# Patient Record
(demographics unavailable — no encounter records)

---

## 2025-02-20 NOTE — PHYSICAL EXAM
[TextEntry] : Normal BMI. +1 tonsils. Kirkpatrick tongue position 2.    PHYSICAL EXAM   General: The patient was alert and oriented and in no distress.   PROCEDURE: Microscopic ear exam INDICATIONS:  Hearing loss. Cerumen impaction Left: Pinna normal,  SIGNIFICANT CERUMEN IMPACTION REMOVED USING OTOLOGIC INSTRUMENTS (loop, suction and alligator), EAC and tympanic membrane within normal limits. No evidence of fluid in the middle ear space. Right: Pinna normal,  SIGNIFICANT CERUMEN IMPACTION REMOVED USING OTOLOGIC INSTRUMENTS (loop, suction and alligator), EAC and tympanic membrane within normal limits. No evidence of fluid in the middle ear space

## 2025-02-20 NOTE — HISTORY OF PRESENT ILLNESS
[de-identified] : Initial visit, referred in consultation. He presents concern for obstructive sleep apnea syndrome.  I reviewed his sleep study from 2021 that demonstrated mild obstructive sleep apnea syndrome. More recently had a sleep study that demonstrated an apnea-hypopnea index of 40.2. Dr. Donaldson started him on CPAP. He reports that he is used 2 different masks and is not able to tolerate it. He does report that he lost 20 pounds recently. He has a follow-up with Dr. Donaldson in the near future.  He has a friend who is having sleep apnea surgery in the nose and oral cavity.  He does report that he was evaluated by an otolaryngologist in the past and found have a deviated septum.  He reports that he cannot sleep with his left side down.  He reports a poor nasal airway on the right.  Additionally an unrelated he reports that he recently had dizziness.  He went to urgent care and they gave him drops and antibiotics by mouth.  They tell me an otitis externa.  He has some air travel and feels congestion in his ear. He does report that he discovered the dizziness was likely from his cholesterol medication which he aborted.

## 2025-02-20 NOTE — END OF VISIT
Addended by: CALVIN BROWN on: 9/30/2019 04:17 PM     Modules accepted: Orders, SmartSet     [Time Spent: ___ minutes] : I have spent [unfilled] minutes of time on the encounter which excludes teaching and separately reported services.

## 2025-02-20 NOTE — ASSESSMENT
[FreeTextEntry1] : Moderate to severe obstructive sleep apnea syndrome. I explained to him that his nasal airway can contribute to the pathology but rarely drives it. Furthermore we discussed possibly not tolerating CPAP because of his septum he does not feel that that is related. We touched on the utility of potential septoplasty in terms of improvement of nasal airway and sleeping. At this point however he will follow-up with Dr. Donaldson I suspect she may repeat a sleep study as he lost 20 pounds.  -Findings were reviewed and discussed with the patient in detail -Good aural hygiene reviewed -Patient may use wax removal drops as needed

## 2025-02-20 NOTE — PROCEDURE
[de-identified] : PROCEDURE: Flexible laryngoscopy SURGEON: Dr. Callaway INDICATIONS: He was unable to tolerate a mirror exam. Assess for laryngopharynx pharyngeal reflux. cough. head and neck mass. Verbal Consent obtained. ANESTHESIA: The patient was placed in a sitting position.  Following application of the topical anesthetic and decongestant, exam was performed with a flexible scope.  The scope was passed along the right nasal floor to the nasopharynx.  It was then passed into the region of the middle meatus, middle turbinate, and sphenoethmoid region.  An identical procedure was performed on the left side.  The following findings were noted:   The nasal musoca was healthy appearing and the septum was roughly midline. The middle meatus and sphenoethmoid recesses were clear bilaterally. The nasopharynx   Nasopharynx: no masses, choanae patent, no adenoid tissue   Base of tongue/vallecula: no masses or asymmetry Pharyngeal walls: symmetrical. No masses. Pyriform sinuses: no lesions or pooling of secretions. Epiglottis: normal. No edema or lesions. Aryepiglottic folds: normal. No lesions. Vocal cords: clear and mobile. No lesions. Airway patent. Arytenoids: no edema or erythema. Interarytenoid area: no edema, erythema or lesion.   Tolerated the procedure well.   Deviated septum to the right with a maxillary crest spur running the entire length of the septum abutting the right lateral nasal wall.

## 2025-03-11 NOTE — HEALTH RISK ASSESSMENT
[Good] : ~his/her~  mood as  good [Yes] : Yes [2 - 3 times a week (3 pts)] : 2 - 3  times a week (3 points) [1 or 2 (0 pts)] : 1 or 2 (0 points) [Never (0 pts)] : Never (0 points) [No] : In the past 12 months have you used drugs other than those required for medical reasons? No [No falls in past year] : Patient reported no falls in the past year [1] : 1) Little interest or pleasure doing things for several days (1) [0] : 2) Feeling down, depressed, or hopeless: Not at all (0) [PHQ-2 Negative - No further assessment needed] : PHQ-2 Negative - No further assessment needed [PHQ-9 Negative - No further assessment needed] : PHQ-9 Negative - No further assessment needed [Former] : Former [10-14] : 10-14 [> 15 Years] : > 15 Years [Patient reported colonoscopy was abnormal] : Patient reported colonoscopy was abnormal [HIV test declined] : HIV test declined [Hepatitis C test declined] : Hepatitis C test declined [With Family] : lives with family [Employed] : employed [] :  [# Of Children ___] : has [unfilled] children [Sexually Active] : sexually active [Fully functional (bathing, dressing, toileting, transferring, walking, feeding)] : Fully functional (bathing, dressing, toileting, transferring, walking, feeding) [Fully functional (using the telephone, shopping, preparing meals, housekeeping, doing laundry, using] : Fully functional and needs no help or supervision to perform IADLs (using the telephone, shopping, preparing meals, housekeeping, doing laundry, using transportation, managing medications and managing finances) [Reports normal functional visual acuity (ie: able to read med bottle)] : Reports normal functional visual acuity [Seat Belt] :  uses seat belt [Sunscreen] : uses sunscreen [Patient/Caregiver not ready to engage] : , patient/caregiver not ready to engage [NO] : No [Audit-CScore] : 3 [[id number]] : 1 [Change in mental status noted] : No change in mental status noted [High Risk Behavior] : no high risk behavior [Reports changes in hearing] : Reports no changes in hearing [Reports changes in vision] : Reports no changes in vision [Reports changes in dental health] : Reports no changes in dental health [TB Exposure] : is not being exposed to tuberculosis [ColonoscopyDate] : 10/2022 [ColonoscopyComments] : Tubular adenoma [AdvancecareDate] : 03/2025

## 2025-03-11 NOTE — PHYSICAL EXAM
[Soft] : abdomen soft [Non Tender] : non-tender [Non-distended] : non-distended [No Masses] : no abdominal mass palpated [No HSM] : no HSM [Normal Bowel Sounds] : normal bowel sounds [Normal] : affect was normal and insight and judgment were intact [de-identified] : anxious

## 2025-03-11 NOTE — HISTORY OF PRESENT ILLNESS
[FreeTextEntry1] : 60-year-old male with history of hypertension, elevated LDL, and palpitations now presenting for yearly CPE. Patient underwent total left-sided hip replacement about 3 years ago without complication.  Otherwise he has had no hospitalization, surgery, or new medical diagnoses over the past 12 months.  [de-identified] : Chronic anxiety for many years following death of son but no longer in grieving state..    Followed by sleep medicine for treatment of sleep apnea after failing CPAP. Followed by cardiology for management of hypertension.  Also seeing weight  in [place] for the past year and has accomplished a 20 pound weight loss.  Overall feels much improved with more energy..

## 2025-03-11 NOTE — HEALTH RISK ASSESSMENT
[Good] : ~his/her~  mood as  good [Yes] : Yes [2 - 3 times a week (3 pts)] : 2 - 3  times a week (3 points) [1 or 2 (0 pts)] : 1 or 2 (0 points) [Never (0 pts)] : Never (0 points) [No] : In the past 12 months have you used drugs other than those required for medical reasons? No [No falls in past year] : Patient reported no falls in the past year [1] : 1) Little interest or pleasure doing things for several days (1) [0] : 2) Feeling down, depressed, or hopeless: Not at all (0) [PHQ-2 Negative - No further assessment needed] : PHQ-2 Negative - No further assessment needed [PHQ-9 Negative - No further assessment needed] : PHQ-9 Negative - No further assessment needed [Former] : Former [10-14] : 10-14 [> 15 Years] : > 15 Years [Patient reported colonoscopy was abnormal] : Patient reported colonoscopy was abnormal [HIV test declined] : HIV test declined [Hepatitis C test declined] : Hepatitis C test declined [With Family] : lives with family [Employed] : employed [] :  [# Of Children ___] : has [unfilled] children [Sexually Active] : sexually active [Fully functional (bathing, dressing, toileting, transferring, walking, feeding)] : Fully functional (bathing, dressing, toileting, transferring, walking, feeding) [Fully functional (using the telephone, shopping, preparing meals, housekeeping, doing laundry, using] : Fully functional and needs no help or supervision to perform IADLs (using the telephone, shopping, preparing meals, housekeeping, doing laundry, using transportation, managing medications and managing finances) [Reports normal functional visual acuity (ie: able to read med bottle)] : Reports normal functional visual acuity [Seat Belt] :  uses seat belt [Sunscreen] : uses sunscreen [Patient/Caregiver not ready to engage] : , patient/caregiver not ready to engage [NO] : No [Audit-CScore] : 3 [[id number]] : 1 [Change in mental status noted] : No change in mental status noted [High Risk Behavior] : no high risk behavior [Reports changes in hearing] : Reports no changes in hearing [Reports changes in vision] : Reports no changes in vision [Reports changes in dental health] : Reports no changes in dental health [TB Exposure] : is not being exposed to tuberculosis [ColonoscopyDate] : 10/2022 [ColonoscopyComments] : Tubular adenoma [AdvancecareDate] : 03/2025

## 2025-03-11 NOTE — PHYSICAL EXAM
[Soft] : abdomen soft [Non Tender] : non-tender [Non-distended] : non-distended [No Masses] : no abdominal mass palpated [No HSM] : no HSM [Normal Bowel Sounds] : normal bowel sounds [Normal] : affect was normal and insight and judgment were intact [de-identified] : anxious

## 2025-03-11 NOTE — ASSESSMENT
[FreeTextEntry1] : Health Maintenance His BMI is good and no weight loss is currently recommended. Daily aerobic exercises strongly recommended. No STD risk or substance abuse per patient report. Occasional gender specific self-examination is suggested. Mild depression. Competent with ADLs. Colonoscopy done 10/2022 showed single tubular adenoma.  Patient advised that he will need to have surveillance colonoscopy done within 5 years. Completed primary COVID-vaccine series with monovalent booster but did not have additional boosters or updated variant specific vaccine.  Declines flu vaccine this year. Explained benefits and potential side effects of shingles vaccine.  Patient wishes to proceed.  Shingrix administered left arm today.  Will return in 3 to 4 months for second and final dose..  HTN BP taken x2 in office today. 140/88.  130/80 Reviewed the rationale (to reduce vascular complications) as well as the goal (blood pressure under 130/90, ideally under 125/85) for blood pressure management. Patient states that his BP at his cardiologist office is consistently in the 120/80 range and he does not feel that any change in regimen is necessary at this time. He will follow-up with his cardiologist.  HAROLDO Has failed CPAP.  Has appointment with sleep medicine to discuss other approaches including dental device and possible neuro implant.  Anxiety/Insomnia Psychological counseling provided.  Patient affect is slightly subdued but appropriate and normally interactive. He denies suicidality or intention for self-harm and is successfully maintaining full work schedule. Reviewed non-medication-based management including meditation/mindfulness exercises and psychotherapy. Patient states his current psychotherapist is "excellent "and that his mood has improved and stabilized in the past 6 months. Has been using lorazepam on a daily basis (along with low-dose hydroxyzine) which have been relatively effective.  Note that prior to his son's death, he had been successfully tapering his lorazepam use However, he was not able to succeed in tapering when he tried again earlier this year.  Hyperlipidemia REVIEWED serial lipid panel results most recently showing LDL at 120 with HDL at 49 on pravastatin 30 mg (did not tolerate prior treatment with atorvastatin). Markedly improved from prior lipid panel (2023) which showed LDL at 161 without treatment. Blood work sent today for follow-up lipid panel.  Note the patient discontinued pravastatin about 1 month ago at the request of a walk-in clinic doctor who thought that it might have been responsible for dizziness. Explained that this is unlikely side effect for this medication.  Patient instructed to resume the pravastatin at 30 mg and to return in 3 months for fasting follow-up lipid panel on treatment.  .

## 2025-03-11 NOTE — HISTORY OF PRESENT ILLNESS
[FreeTextEntry1] : 60-year-old male with history of hypertension, elevated LDL, and palpitations now presenting for yearly CPE. Patient underwent total left-sided hip replacement about 3 years ago without complication.  Otherwise he has had no hospitalization, surgery, or new medical diagnoses over the past 12 months.  [de-identified] : Chronic anxiety for many years following death of son but no longer in grieving state..    Followed by sleep medicine for treatment of sleep apnea after failing CPAP. Followed by cardiology for management of hypertension.  Also seeing weight  in [place] for the past year and has accomplished a 20 pound weight loss.  Overall feels much improved with more energy..

## 2025-04-02 NOTE — HISTORY OF PRESENT ILLNESS
[FreeTextEntry1] : 60yo with history of insomnia, stress as a result of COVID19. Some factors have improved. He is following good sleep hygiene but still has residual poor sleep. Had a prior HST.  8/29/2024 Had HST, here to discuss results. Going on vacation for 2 weeks.  4/1/2025 has been struggling with PAP tried several masks lost 20 lbs [ESS] : 5

## 2025-04-02 NOTE — CONSULT LETTER
[Dear  ___] : Dear  [unfilled], [Courtesy Letter:] : I had the pleasure of seeing your patient, [unfilled], in my office today. [Please see my note below.] : Please see my note below. [Consult Closing:] : Thank you very much for allowing me to participate in the care of this patient.  If you have any questions, please do not hesitate to contact me. [Sincerely,] : Sincerely, [FreeTextEntry3] : Deepika Donaldson MD  [employer] at [facility] Pulmonary, Critical Care, and Sleep Medicine

## 2025-04-02 NOTE — REVIEW OF SYSTEMS
[Difficulty Initiating Sleep] : difficulty falling asleep [Difficulty Maintaining Sleep] : difficulty maintaining sleep [Hypersomnolence] : not sleeping much more than usual [Negative] : Genitourinary

## 2025-04-02 NOTE — PHYSICAL EXAM
[General Appearance - Well Developed] : well developed [General Appearance - Well Nourished] : well nourished [Neck Appearance] : the appearance of the neck was normal [] : no respiratory distress [Exaggerated Use Of Accessory Muscles For Inspiration] : no accessory muscle use [Abnormal Walk] : normal gait [Oriented To Time, Place, And Person] : oriented to person, place, and time

## 2025-04-02 NOTE — ASSESSMENT
[FreeTextEntry1] : REVIEWED HST 2021: AHI 13.9 4% HST 8/11/2024: 27 (4%); AHI 40 (3%); t88 1.3 minutes AirView compliance  Mr. Hernandez with moderate to severe HAROLDO. Has not been able to tolerate PAP despite multiple mask changes. Has lost 20 lbs. Will repeat HST to see if AHI has reduced to consider MAD as an option. Also discussed CN12 stimualtion. Follow up after HST.

## 2025-04-15 NOTE — ASSESSMENT
[FreeTextEntry1] : Mr. Latrell Hernandez is a 60-year-old  for [employer] who is  who presents today with concern regarding scrotal finding.  He states 1 week ago he noted "blood blister on his scrotum".  He is unaware of this previously.  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  This has been treated with electrodesiccation.  On examination he has multiple Reshma spots on his scrotum.  These are benign.  We discussed the benign nature of these.  Additionally, he does have a apparent HPV lesion on his lower abdomen on the right side.  I suggested electrodesiccation.  He we will proceed and follow-up with Dr. Bharathi Cabello.  This appointment has already been made.  Plan: 1.  Reassurance 2.  Follow-up with Dr. Hernandez  re: HPV management

## 2025-04-15 NOTE — HISTORY OF PRESENT ILLNESS
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 blood blister history of HPV x 10 years  treated by Dr Bharathi Hernandez who was unable to see [None] : no symptoms [Erectile Dysfunction] : no Erectile Dysfunction

## 2025-04-15 NOTE — PHYSICAL EXAM
[Urethral Meatus] : meatus normal [Penis Abnormality] : normal circumcised penis [Epididymis] : the epididymides were normal [Testes Tenderness] : no tenderness of the testes [Testes Mass (___cm)] : there were no testicular masses [de-identified] : small condyloma on lower abdomen [de-identified] : multiple su spoits [Chaperone Present] : A chaperone was present in the examining room during all aspects of the physical examination [FreeTextEntry2] : JOSE GUADALUPE Hernandez MD

## 2025-07-09 NOTE — ASSESSMENT
[FreeTextEntry1] : HLD REVIEWED lipid panel dated May 12, 2025 which shows elevated LDL at 150. Since then, he has been on pravastatin 30 mg and follow-up testing sent today to assess efficacy. However, of note, pravastatin is capable of causing tingling sensation in fingers as a manifestation of peripheral neuropathy.  Therefore I have asked patient to discontinue the pravastatin as of today and to reassess tingling sensation complaint in about 3 or 4 weeks.  If no other etiology (e.g., cervical myopathy) and if good response to discontinuing pravastatin, this will confirm etiology and non-statin treatment for HLD will have to be considered. Patient indicates understanding and agreement with the above.  Immunization Shingrix administered left arm completing vaccine series.  Patient is aware that additional Shingrix vaccines will not be necessary in the future.  HTN BP in office today is 130/88, markedly improved from prior BP in April which had been 152/84. Continue current regimen (enalapril 20 mg).  Will continue to follow on an intermittent basis to confirm stability of improvement.  Bilateral hand tingling Based on patient report, Raynaud's phenomenon seems very unlikely for a number of reasons.  No association with cold weather (i in fact just the opposite); no description of color change series which is typical for Raynaud's; persistence of tingling even when cold sensation is not present; single episode of minimal color change of 1 finger rather than multiple episodes of color change in multiple fingers.  In any case, we will send blood work for autoimmune/inflammatory workup including rheumatoid factor, ALICIA, ESR, and CRP. More concerning is possibility of cervical myopathy (even though this was not seen on chest CT scan from emergency room visit 3 weeks ago based on patient report).  Agree with further workup by spinal neurosurgery, likely to include MRI study..  However, this diagnosis is also not straightforward as patient denies any previous injury, sports activity, or repetitive activities prior to onset of symptoms. Less likely possible etiologies include Lyme disease, B12 deficiency, and carpal tunnel syndrome.  Will send appropriate blood work.  Neurosurgery likely to arrange for EMG testing. Also, as mentioned above, although unlikely, symptoms could possibly be secondary to side effect from pravastatin.  Patient will discontinue pravastatin and will reassess in 3 weeks (assuming negative neurosurgery workup).

## 2025-07-09 NOTE — HISTORY OF PRESENT ILLNESS
[FreeTextEntry1] : HLD HTN Immunization Tingling sensation in bilateral hands. [de-identified] : Patient returns for his second Shingrix vaccine and for lipid panel now that he is back on statin medication (pravastatin 30 mg, well-tolerated) In addition, he complains of bilateral tingling and cold sensation affecting both hands which started when he woke up about 1 month ago and has slowly progressed since then.  At one point he reports that his left index finger tip was blue briefly but had not gone through a series of colors including white-blue-red and was not followed by a warm sensation with hyperemia. On questioning, he denies any specific neck injury or repetitive activity.  He was seen in a local emergency room in [place] approximately 3 weeks ago.  Workup was negative including neck CT scan.  He has follow-up appointment with neurosurgery later today.

## 2025-07-09 NOTE — HISTORY OF PRESENT ILLNESS
[FreeTextEntry1] : HLD HTN Immunization Tingling sensation in bilateral hands. [de-identified] : Patient returns for his second Shingrix vaccine and for lipid panel now that he is back on statin medication (pravastatin 30 mg, well-tolerated) In addition, he complains of bilateral tingling and cold sensation affecting both hands which started when he woke up about 1 month ago and has slowly progressed since then.  At one point he reports that his left index finger tip was blue briefly but had not gone through a series of colors including white-blue-red and was not followed by a warm sensation with hyperemia. On questioning, he denies any specific neck injury or repetitive activity.  He was seen in a local emergency room in [place] approximately 3 weeks ago.  Workup was negative including neck CT scan.  He has follow-up appointment with neurosurgery later today.

## 2025-07-15 NOTE — HISTORY OF PRESENT ILLNESS
[FreeTextEntry1] : tingling of the fingers [de-identified] : 60 y/o male with hx of HTN and HLD. comes in for tingling of his fingers and toes bilaterally for about 1 month. He denies any weakness of the upper extremities.  He has not completed any physical therapy.

## 2025-07-15 NOTE — HISTORY OF PRESENT ILLNESS
[FreeTextEntry1] : tingling of the fingers [de-identified] : 62 y/o male with hx of HTN and HLD. comes in for tingling of his fingers and toes bilaterally for about 1 month. He denies any weakness of the upper extremities.  He has not completed any physical therapy.

## 2025-07-15 NOTE — PHYSICAL EXAM
[General Appearance - Alert] : alert [Oriented To Time, Place, And Person] : oriented to person, place, and time [Person] : oriented to person [Place] : oriented to place [Time] : oriented to time [Motor Tone] : muscle tone was normal in all four extremities [Motor Strength] : muscle strength was normal in all four extremities [Abnormal Walk] : normal gait [Balance] : balance was intact [2+] : Patella left 2+ [] : no respiratory distress [Over the Past 2 Weeks, Have You Felt Down, Depressed, or Hopeless?] : 1.) Over the past 2 weeks, have you felt down, depressed, or hopeless? No [Over the Past 2 Weeks, Have You Felt Little Interest or Pleasure Doing Things?] : 2.) Over the past 2 weeks, have you felt little interest or pleasure doing things? No [Limited Balance] : balance was intact

## 2025-07-15 NOTE — ASSESSMENT
[FreeTextEntry1] : Plan: Physical therapy for 6-8 weeks If no improvement in symptoms will obtain an MRI